# Patient Record
Sex: MALE | Race: WHITE | NOT HISPANIC OR LATINO | Employment: OTHER | URBAN - METROPOLITAN AREA
[De-identification: names, ages, dates, MRNs, and addresses within clinical notes are randomized per-mention and may not be internally consistent; named-entity substitution may affect disease eponyms.]

---

## 2024-01-29 PROBLEM — E66.3 OVERWEIGHT WITH BODY MASS INDEX (BMI) OF 28 TO 28.9 IN ADULT: Status: ACTIVE | Noted: 2024-01-29

## 2024-01-29 PROBLEM — I10 ESSENTIAL HYPERTENSION: Status: ACTIVE | Noted: 2024-01-29

## 2024-01-29 PROBLEM — I25.10 CORONARY ARTERY DISEASE WITHOUT ANGINA PECTORIS: Status: ACTIVE | Noted: 2024-01-29

## 2024-01-29 PROBLEM — E78.2 MIXED HYPERLIPIDEMIA: Status: RESOLVED | Noted: 2024-01-29 | Resolved: 2024-01-29

## 2024-01-29 PROBLEM — E66.3 OVERWEIGHT: Status: ACTIVE | Noted: 2024-01-29

## 2024-01-29 PROBLEM — R07.9 CHEST PAIN: Status: ACTIVE | Noted: 2024-01-29

## 2024-01-29 PROBLEM — Z87.891 FORMER SMOKER: Status: ACTIVE | Noted: 2024-01-29

## 2024-01-29 RX ORDER — LISINOPRIL 5 MG/1
5 TABLET ORAL DAILY
COMMUNITY
End: 2024-05-23 | Stop reason: WASHOUT

## 2024-01-29 RX ORDER — PETROLATUM,WHITE/LANOLIN
OINTMENT (GRAM) TOPICAL
COMMUNITY

## 2024-01-29 RX ORDER — PANTOPRAZOLE SODIUM 40 MG/1
TABLET, DELAYED RELEASE ORAL 2 TIMES DAILY
COMMUNITY

## 2024-01-29 RX ORDER — UBIDECARENONE 75 MG
1 CAPSULE ORAL DAILY
COMMUNITY

## 2024-05-23 ENCOUNTER — OFFICE VISIT (OUTPATIENT)
Dept: CARDIOLOGY | Facility: CLINIC | Age: 74
End: 2024-05-23
Payer: MEDICARE

## 2024-05-23 VITALS
WEIGHT: 198.2 LBS | HEART RATE: 72 BPM | DIASTOLIC BLOOD PRESSURE: 80 MMHG | BODY MASS INDEX: 27.75 KG/M2 | HEIGHT: 71 IN | SYSTOLIC BLOOD PRESSURE: 120 MMHG

## 2024-05-23 DIAGNOSIS — Z87.891 FORMER SMOKER: ICD-10-CM

## 2024-05-23 DIAGNOSIS — I25.10 CORONARY ARTERY DISEASE INVOLVING NATIVE HEART WITHOUT ANGINA PECTORIS, UNSPECIFIED VESSEL OR LESION TYPE: ICD-10-CM

## 2024-05-23 DIAGNOSIS — E66.3 OVERWEIGHT WITH BODY MASS INDEX (BMI) OF 28 TO 28.9 IN ADULT: ICD-10-CM

## 2024-05-23 DIAGNOSIS — I10 ESSENTIAL HYPERTENSION: Primary | ICD-10-CM

## 2024-05-23 DIAGNOSIS — E78.2 MIXED HYPERLIPIDEMIA: ICD-10-CM

## 2024-05-23 PROCEDURE — 99212 OFFICE O/P EST SF 10 MIN: CPT | Performed by: INTERNAL MEDICINE

## 2024-05-23 PROCEDURE — 3079F DIAST BP 80-89 MM HG: CPT | Performed by: INTERNAL MEDICINE

## 2024-05-23 PROCEDURE — 1036F TOBACCO NON-USER: CPT | Performed by: INTERNAL MEDICINE

## 2024-05-23 PROCEDURE — 1159F MED LIST DOCD IN RCRD: CPT | Performed by: INTERNAL MEDICINE

## 2024-05-23 PROCEDURE — 3074F SYST BP LT 130 MM HG: CPT | Performed by: INTERNAL MEDICINE

## 2024-05-23 PROCEDURE — 3008F BODY MASS INDEX DOCD: CPT | Performed by: INTERNAL MEDICINE

## 2024-05-23 RX ORDER — VALSARTAN 40 MG/1
40 TABLET ORAL DAILY
Qty: 90 TABLET | Refills: 3 | Status: SHIPPED | OUTPATIENT
Start: 2024-05-23 | End: 2025-05-23

## 2024-05-23 RX ORDER — VALSARTAN 40 MG/1
40 TABLET ORAL DAILY
COMMUNITY
End: 2024-05-23 | Stop reason: SDUPTHER

## 2024-05-23 NOTE — PATIENT INSTRUCTIONS
Continue same medications and treatments.   Patient educated on proper medication use.   Patient educated on risk factor modification.   Please bring any lab results from other providers / physicians to your next appointment.     Please bring all medicines, vitamins, and herbal supplements with you when you come to the office.     Prescriptions will not be filled unless you are compliant with your follow up appointments or have a follow up appointment scheduled as per instruction of your physician. Refills should be requested at the time of your visit.    FOLLOW UP IN 1 YEAR    I, Nichole Douglas LPN, am scribing for and in the presence of Dr. Wilfredo Mas MD, FACC

## 2024-05-23 NOTE — PROGRESS NOTES
CARDIOLOGY OFFICE VISIT      CHIEF COMPLAINT      HISTORY OF PRESENT ILLNESS  The patient states that he seems to be doing well from a cardiac standpoint.  He continues to play handball twice a week without any chest discomfort or symptoms suggest myocardial ischemia.  He states his main problem is arthritis.  This seems to be getting worse.  He also is having problems with his cervical spine and neck may have that is causing some of his neck discomfort in the back and some discomfort in his arms at times.  He also has occasional reflux symptoms in his chest but nothing to suggest myocardial ischemia.  He denies dyspnea exertion.  He denies prolonged palpitation, prescan, and syncope.  He denies any problem with his current medications.      IMPRESSION:   1. Coronary disease, mild by CTA, 2015  2. Essential hypertension.  3. Mixed hyperlipidemia, unable to tolerate statins.  4. Overweight.  5. 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 Voice recognition software was utilized to prepare this document.    Past Medical History  Past Medical History:   Diagnosis Date    Hypertension        Social History  Social History     Tobacco Use    Smoking status: Former     Current packs/day: 0.00     Average packs/day: 1 pack/day for 12.8 years (12.8 ttl pk-yrs)     Types: Cigarettes     Start date: 1970     Quit date: 6/10/1983     Years since quittin.9    Smokeless tobacco: Never   Substance Use Topics    Alcohol use: Yes     Alcohol/week: 10.0 standard drinks of alcohol     Types: 10 Shots of liquor per week    Drug use: Never       Family History     Family History   Problem Relation Name Age of Onset    Hypertension Mother      Other (family history of healthy adult) Mother      Other (cardiac disorder) Father linda     Other (malignant neoplasm of  prostate) Father linda     Cancer Father linda     Other (myocardial infarction) Other Maternal Uncle     Other (CABG) Other Maternal Uncle     Heart failure Brother Ritesh         Allergies:  Allergies   Allergen Reactions    Other Unknown     Statins    Zetia [Ezetimibe] Unknown     Zetia TABS        Outpatient Medications:  Current Outpatient Medications   Medication Instructions    cyanocobalamin (Vitamin B-12) 500 mcg tablet 1 tablet, oral, Daily    diphenhydrAMINE-acetaminophen (Tylenol PM)  mg per tablet 1 tablet, oral, Nightly PRN    glucosamine sulfate 500 mg capsule oral, Take as directed    pantoprazole (ProtoNix) 40 mg EC tablet oral, 2 times daily, 30 minutes before breakfast and dinner    valsartan (DIOVAN) 40 mg, oral, Daily    vit A/vit C/vit E/zinc/copper (PRESERVISION AREDS ORAL) oral, Take as Directed          REVIEW OF SYSTEMS  Review of Systems   All other systems reviewed and are negative.        VITALS  Vitals:    05/23/24 1442   BP: 120/80   Pulse: 72       PHYSICAL EXAM  Constitutional:       Appearance: Healthy appearance. Not in distress.   Neck:      Vascular: No JVR. JVD normal.   Pulmonary:      Effort: Pulmonary effort is normal.      Breath sounds: Normal breath sounds. No wheezing. No rhonchi. No rales.   Chest:      Chest wall: Not tender to palpatation.   Cardiovascular:      PMI at left midclavicular line. Normal rate. Regular rhythm. Normal S1. Normal S2.       Murmurs: There is no murmur.      No gallop.  No click. No rub.   Pulses:     Intact distal pulses.   Edema:     Peripheral edema absent.   Abdominal:      General: Bowel sounds are normal.      Palpations: Abdomen is soft.      Tenderness: There is no abdominal tenderness.   Musculoskeletal: Normal range of motion.         General: No tenderness. Skin:     General: Skin is warm and dry.   Neurological:      General: No focal deficit present.      Mental Status: Alert and oriented to person, place and time.            ASSESSMENT AND PLAN  Diagnoses and all orders for this visit:  Coronary artery disease involving native heart without angina pectoris, unspecified vessel or lesion type  Essential hypertension  Mixed hyperlipidemia  Overweight with body mass index (BMI) of 28 to 28.9 in adult  Former smoker      [unfilled]

## 2024-10-16 ENCOUNTER — HOSPITAL ENCOUNTER (EMERGENCY)
Facility: HOSPITAL | Age: 74
Discharge: HOME | End: 2024-10-16
Payer: MEDICARE

## 2024-10-16 VITALS
TEMPERATURE: 98.1 F | HEART RATE: 98 BPM | WEIGHT: 194 LBS | HEIGHT: 71 IN | RESPIRATION RATE: 18 BRPM | BODY MASS INDEX: 27.16 KG/M2 | SYSTOLIC BLOOD PRESSURE: 156 MMHG | DIASTOLIC BLOOD PRESSURE: 93 MMHG

## 2024-10-16 DIAGNOSIS — R04.0 EPISTAXIS: Primary | ICD-10-CM

## 2024-10-16 PROCEDURE — 99282 EMERGENCY DEPT VISIT SF MDM: CPT

## 2024-10-16 PROCEDURE — 2500000001 HC RX 250 WO HCPCS SELF ADMINISTERED DRUGS (ALT 637 FOR MEDICARE OP): Performed by: PHYSICIAN ASSISTANT

## 2024-10-16 RX ORDER — OXYMETAZOLINE HCL 0.05 %
2 SPRAY, NON-AEROSOL (ML) NASAL ONCE
Status: COMPLETED | OUTPATIENT
Start: 2024-10-16 | End: 2024-10-16

## 2024-10-16 ASSESSMENT — LIFESTYLE VARIABLES
EVER HAD A DRINK FIRST THING IN THE MORNING TO STEADY YOUR NERVES TO GET RID OF A HANGOVER: NO
HAVE PEOPLE ANNOYED YOU BY CRITICIZING YOUR DRINKING: NO
TOTAL SCORE: 0
HAVE YOU EVER FELT YOU SHOULD CUT DOWN ON YOUR DRINKING: NO
EVER FELT BAD OR GUILTY ABOUT YOUR DRINKING: NO

## 2024-10-16 ASSESSMENT — PAIN - FUNCTIONAL ASSESSMENT: PAIN_FUNCTIONAL_ASSESSMENT: 0-10

## 2024-10-16 ASSESSMENT — PAIN SCALES - GENERAL: PAINLEVEL_OUTOF10: 0 - NO PAIN

## 2024-10-16 ASSESSMENT — COLUMBIA-SUICIDE SEVERITY RATING SCALE - C-SSRS
6. HAVE YOU EVER DONE ANYTHING, STARTED TO DO ANYTHING, OR PREPARED TO DO ANYTHING TO END YOUR LIFE?: NO
2. HAVE YOU ACTUALLY HAD ANY THOUGHTS OF KILLING YOURSELF?: NO
1. IN THE PAST MONTH, HAVE YOU WISHED YOU WERE DEAD OR WISHED YOU COULD GO TO SLEEP AND NOT WAKE UP?: NO

## 2024-10-16 NOTE — ED PROVIDER NOTES
HPI   Chief Complaint   Patient presents with    Epistaxis (Nose Bleed)       A 74-year-old male patient comes into the emergency department today with complaints of bloody nose that started approximately 15 minutes prior to arrival secondary to blowing his nose.  States he has been unable to get the bleeding to stop at home therefore he came to the emergency department for further evaluation.  Otherwise no other complaints at this present time.  Denies any digital trauma.              Patient History   Past Medical History:   Diagnosis Date    Hypertension      Past Surgical History:   Procedure Laterality Date    CARPAL TUNNEL RELEASE Right 2023    Carpal Tunnel Surgery - Right Wrist    OTHER SURGICAL HISTORY  2021    Colonoscopy    OTHER SURGICAL HISTORY  2021    Hip replacement     Family History   Problem Relation Name Age of Onset    Hypertension Mother      Other (family history of healthy adult) Mother      Other (cardiac disorder) Father linda     Other (malignant neoplasm of prostate) Father linda     Cancer Father linda     Other (myocardial infarction) Other Maternal Uncle     Other (CABG) Other Maternal Uncle     Heart failure Brother Ritesh      Social History     Tobacco Use    Smoking status: Former     Current packs/day: 0.00     Average packs/day: 1 pack/day for 12.8 years (12.8 ttl pk-yrs)     Types: Cigarettes     Start date: 1970     Quit date: 6/10/1983     Years since quittin.3    Smokeless tobacco: Never   Substance Use Topics    Alcohol use: Yes     Alcohol/week: 10.0 standard drinks of alcohol     Types: 10 Shots of liquor per week    Drug use: Never       Physical Exam   ED Triage Vitals [10/16/24 1313]   Temperature Heart Rate Respirations BP   36.7 °C (98.1 °F) 98 18 (!) 156/93      SpO2 Temp Source Heart Rate Source Patient Position   -- Temporal Monitor Sitting      BP Location FiO2 (%)     Right arm --       Physical Exam  Constitutional:       General: He is not in  acute distress.     Appearance: Normal appearance. He is not ill-appearing or diaphoretic.   HENT:      Head: Normocephalic and atraumatic.      Nose: Nose normal.      Comments: Epistaxis right nare, no septal hematoma.  Cannot visualize specific area of hemorrhage  Eyes:      Extraocular Movements: Extraocular movements intact.      Conjunctiva/sclera: Conjunctivae normal.      Pupils: Pupils are equal, round, and reactive to light.   Cardiovascular:      Rate and Rhythm: Normal rate and regular rhythm.   Pulmonary:      Effort: Pulmonary effort is normal. No respiratory distress.      Breath sounds: Normal breath sounds. No stridor. No wheezing.   Musculoskeletal:         General: Normal range of motion.      Cervical back: Normal range of motion.   Skin:     General: Skin is warm and dry.   Neurological:      General: No focal deficit present.      Mental Status: He is alert and oriented to person, place, and time. Mental status is at baseline.   Psychiatric:         Mood and Affect: Mood normal.           ED Course & MDM   Diagnoses as of 10/16/24 1522   Epistaxis                 No data recorded     [place] Coma Scale Score: 15 (10/16/24 1316 : Yolis Rodriguez RN)                           Medical Decision Making  A 74-year-old male patient comes into the emergency department today with complaints of bloody nose that started approximately 15 minutes prior to arrival secondary to blowing his nose.  States he has been unable to get the bleeding to stop at home therefore he came to the emergency department for further evaluation.  Otherwise no other complaints at this present time.  Denies any digital trauma.    Nasal clamp was applied and patient watched upon removing this patient continued to have some oozing tickly from the right nostril.  Cotton balls soaked in Afrin were then packed into patient's bilateral nostrils and pressure was applied for approximately 20 to 25 minutes.    I reevaluated the patient  and patient no longer has any epistaxis at this time.  No more blood in the nares with vessels constricted.  No septal hematoma.    Will watch the patient for period of 15 minutes prior to discharge.    On reevaluation patient still continues to do well.  Will discharge patient home.  Patient agrees with this plan expressed verbal understanding.  All questions answered.    Historian is the patient    Diagnosis: Epistaxis        Procedure  Procedures     Jesús Bustillo PA-C  10/16/24 1528

## 2025-05-22 ENCOUNTER — APPOINTMENT (OUTPATIENT)
Dept: CARDIOLOGY | Facility: CLINIC | Age: 75
End: 2025-05-22
Payer: MEDICARE

## 2025-05-22 VITALS
HEART RATE: 84 BPM | HEIGHT: 72 IN | WEIGHT: 208.6 LBS | DIASTOLIC BLOOD PRESSURE: 64 MMHG | BODY MASS INDEX: 28.25 KG/M2 | SYSTOLIC BLOOD PRESSURE: 110 MMHG

## 2025-05-22 DIAGNOSIS — E66.3 OVERWEIGHT WITH BODY MASS INDEX (BMI) OF 28 TO 28.9 IN ADULT: ICD-10-CM

## 2025-05-22 DIAGNOSIS — R01.1 HEART MURMUR: ICD-10-CM

## 2025-05-22 DIAGNOSIS — I10 ESSENTIAL HYPERTENSION: ICD-10-CM

## 2025-05-22 DIAGNOSIS — Z87.891 FORMER SMOKER: ICD-10-CM

## 2025-05-22 DIAGNOSIS — R00.2 PALPITATIONS: ICD-10-CM

## 2025-05-22 DIAGNOSIS — Z01.810 PREOPERATIVE CARDIOVASCULAR EXAMINATION: ICD-10-CM

## 2025-05-22 DIAGNOSIS — E78.2 MIXED HYPERLIPIDEMIA: ICD-10-CM

## 2025-05-22 DIAGNOSIS — R07.9 CHEST PAIN, UNSPECIFIED TYPE: ICD-10-CM

## 2025-05-22 DIAGNOSIS — I25.10 CORONARY ARTERY DISEASE INVOLVING NATIVE HEART WITHOUT ANGINA PECTORIS, UNSPECIFIED VESSEL OR LESION TYPE: Primary | ICD-10-CM

## 2025-05-22 PROCEDURE — 1159F MED LIST DOCD IN RCRD: CPT | Performed by: INTERNAL MEDICINE

## 2025-05-22 PROCEDURE — 1036F TOBACCO NON-USER: CPT | Performed by: INTERNAL MEDICINE

## 2025-05-22 PROCEDURE — 99212 OFFICE O/P EST SF 10 MIN: CPT | Performed by: INTERNAL MEDICINE

## 2025-05-22 PROCEDURE — 3008F BODY MASS INDEX DOCD: CPT | Performed by: INTERNAL MEDICINE

## 2025-05-22 PROCEDURE — 3078F DIAST BP <80 MM HG: CPT | Performed by: INTERNAL MEDICINE

## 2025-05-22 PROCEDURE — 3074F SYST BP LT 130 MM HG: CPT | Performed by: INTERNAL MEDICINE

## 2025-05-22 PROCEDURE — 93000 ELECTROCARDIOGRAM COMPLETE: CPT | Performed by: INTERNAL MEDICINE

## 2025-05-22 RX ORDER — VALSARTAN 40 MG/1
40 TABLET ORAL DAILY
Qty: 90 TABLET | Refills: 3 | Status: SHIPPED | OUTPATIENT
Start: 2025-05-22 | End: 2026-05-22

## 2025-05-22 RX ORDER — LOTILANER OPHTHALMIC SOLUTION 2.5 MG/ML
SOLUTION/ DROPS OPHTHALMIC
COMMUNITY
Start: 2025-05-15

## 2025-05-22 ASSESSMENT — ENCOUNTER SYMPTOMS
PALPITATIONS: 1
DYSPNEA ON EXERTION: 0

## 2025-05-22 NOTE — PROGRESS NOTES
CARDIOLOGY OFFICE VISIT      CHIEF COMPLAINT  Chief Complaint   Patient presents with    Follow-up     1 year follow up   Essential hypertension  Coronary artery disease involving native heart without angina pectoris, unspecified vessel or lesion type          HISTORY OF PRESENT ILLNESS    The patient states that he is having problems with his hip and has to have hip replacement surgery in the near future at [facility].  He did have his other hip done about 18 or 19 years ago.  He had to quit playing [employer] for the last 2 or 3 months because of this problem.  He states on rare occasion he will feel a brief flutter sensation in his chest.  He denies prolonged palpitations, presyncope and syncope.  He denies any chest discomfort with exertion to suggest myocardial ischemia.  He states that once a while he does have a burning sensation in his upper epigastric lower retrosternal chest area.  This is always at rest and he believes it is GI in nature and I believe he is correct.  He has not had any chest discomfort associated with exertion.  He denies any significant dyspnea with exertion.    EKG: Normal sinus rhythm, left anterior hemiblock, complete right bundle branch block, no change from EKG May 2023    IMPRESSION:   1. Coronary disease, mild by CTA, January 2015  2. Essential hypertension.  3. Mixed hyperlipidemia, unable to tolerate statins.  4. Overweight.  5. Cardiac conduction system disease manifested by left anterior hemiblock and right bundle branch block    Plan:  Echocardiogram to evaluate left ventricular systolic function prior to upcoming hip replacement surgery, will call patient with results    The patient's cardiac status is stable.  He is a satisfactory risk for his upcoming needed surgery assuming his routine preop lab work is satisfactory and his upcoming echocardiogram is satisfactory    Please excuse any errors in grammar or translation related to this dictation.  Voice recognition software was  utilized to prepare this document.    Past Medical History  Medical History[1]    Social History  Social History[2]    Family History   Family History[3]     Allergies:  RX Allergies[4]     Outpatient Medications:  Current Outpatient Medications   Medication Instructions    cyanocobalamin (Vitamin B-12) 500 mcg tablet 1 tablet, Daily    glucosamine sulfate 500 mg capsule Take by mouth. Take as directed    valsartan (DIOVAN) 40 mg, oral, Daily    vit A/vit C/vit E/zinc/copper (PRESERVISION AREDS ORAL) Take by mouth. Take as Directed    Xdemvy 0.25 % drops INSTILL 1 DROP IN BOTH EYES TWICE DAILY FOR 6 WEEKS          REVIEW OF SYSTEMS  Review of Systems   Cardiovascular:  Positive for chest pain and palpitations. Negative for dyspnea on exertion.   All other systems reviewed and are negative.        VITALS  Vitals:    05/22/25 1421   BP: 110/64   Pulse: 84       PHYSICAL EXAM  Constitutional:       General: Awake.      Appearance: Normal and healthy appearance. Well-developed and not in distress.   Neck:      Vascular: No JVR. JVD normal.   Pulmonary:      Effort: Pulmonary effort is normal.      Breath sounds: Normal breath sounds. No wheezing. No rhonchi. No rales.   Chest:      Chest wall: Not tender to palpatation.   Cardiovascular:      PMI at left midclavicular line. Normal rate. Regular rhythm. Normal S1. Normal S2.       Murmurs: There is a grade 1/6 systolic murmur at the apex.      No gallop.  No click. No rub.   Pulses:     Intact distal pulses.   Edema:     Peripheral edema absent.   Abdominal:      Tenderness: There is no abdominal tenderness.   Musculoskeletal: Normal range of motion.         General: No tenderness. Skin:     General: Skin is warm and dry.   Neurological:      General: No focal deficit present.      Mental Status: Alert and oriented to person, place and time.           ASSESSMENT AND PLAN  Diagnoses and all orders for this visit:  Coronary artery disease involving native heart without  angina pectoris, unspecified vessel or lesion type  Chest pain, unspecified type  Essential hypertension  Mixed hyperlipidemia  Former smoker  Overweight with body mass index (BMI) of 28 to 28.9 in adult  Palpitations  Preoperative cardiovascular examination      [unfilled]      LOLA,Alicia MEJIA am scribing for, and in the presence of Dr. Mas.    I, Dr. Mas, personally performed the services described in the documentation as scribed by the nurse in my presence, and confirm it is both accurate and complete.      Dr. Wilfredo Huffman MD  Thank you for allowing me to participate in the care of this patient. Please do not hesitate to contact me with any further questions or concerns.         [1]   Past Medical History:  Diagnosis Date    Hypertension    [2]   Social History  Tobacco Use    Smoking status: Former     Current packs/day: 0.00     Average packs/day: 1 pack/day for 12.8 years (12.8 ttl pk-yrs)     Types: Cigarettes     Start date: 1970     Quit date: 6/10/1983     Years since quittin.9    Smokeless tobacco: Never   Substance Use Topics    Alcohol use: Yes     Alcohol/week: 10.0 standard drinks of alcohol     Types: 10 Shots of liquor per week    Drug use: Never   [3]   Family History  Problem Relation Name Age of Onset    Hypertension Mother      Other (family history of healthy adult) Mother      Other (cardiac disorder) Father linda     Other (malignant neoplasm of prostate) Father linda     Cancer Father linda     Other (myocardial infarction) Other Maternal Uncle     Other (CABG) Other Maternal Uncle     Heart failure Brother Ritesh    [4]   Allergies  Allergen Reactions    Other Unknown     Statins    Zetia [Ezetimibe] Unknown     Zetia TABS

## 2025-05-22 NOTE — PATIENT INSTRUCTIONS
Continue same medications/treatment.  Patient educated on proper medication use.  Patient educated on risk factor modification.  Please bring any lab results from other providers/physicians to your next appointment.    Please bring all medicines, vitamins, and herbal supplements with you when you come to the office.    Prescriptions will not be filled unless you are compliant with your follow up appointments or have a follow up appointment scheduled as per instruction of your physician. Refills should be requested at the time of your visit.    SCHEDULE Echocardiogram  Follow up with Dr. Mas in 6 months     Alicia DAVILA RN, am scribing for and in the presence of, Dr. Wilfredo Mas MD, FACC

## 2025-06-26 ENCOUNTER — APPOINTMENT (OUTPATIENT)
Dept: CARDIOLOGY | Facility: CLINIC | Age: 75
End: 2025-06-26
Payer: MEDICARE

## 2025-11-12 ENCOUNTER — APPOINTMENT (OUTPATIENT)
Dept: CARDIOLOGY | Facility: CLINIC | Age: 75
End: 2025-11-12
Payer: MEDICARE